# Patient Record
Sex: FEMALE | Race: BLACK OR AFRICAN AMERICAN | NOT HISPANIC OR LATINO | Employment: FULL TIME | URBAN - METROPOLITAN AREA
[De-identification: names, ages, dates, MRNs, and addresses within clinical notes are randomized per-mention and may not be internally consistent; named-entity substitution may affect disease eponyms.]

---
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
## 2023-01-11 ENCOUNTER — TELEPHONE (OUTPATIENT)
Dept: OBSTETRICS AND GYNECOLOGY | Facility: CLINIC | Age: 18
End: 2023-01-11

## 2023-08-16 ENCOUNTER — HOSPITAL ENCOUNTER (EMERGENCY)
Facility: HOSPITAL | Age: 18
Discharge: LEFT BEFORE DISCHARGE | End: 2023-08-16
Payer: COMMERCIAL

## 2023-08-16 VITALS
TEMPERATURE: 98.2 F | DIASTOLIC BLOOD PRESSURE: 110 MMHG | RESPIRATION RATE: 18 BRPM | HEART RATE: 105 BPM | WEIGHT: 210 LBS | SYSTOLIC BLOOD PRESSURE: 146 MMHG | BODY MASS INDEX: 33.75 KG/M2 | HEIGHT: 66 IN | OXYGEN SATURATION: 100 %

## 2023-08-16 PROCEDURE — 99211 OFF/OP EST MAY X REQ PHY/QHP: CPT

## 2023-08-16 RX ORDER — SODIUM CHLORIDE 0.9 % (FLUSH) 0.9 %
10 SYRINGE (ML) INJECTION AS NEEDED
Status: DISCONTINUED | OUTPATIENT
Start: 2023-08-16 | End: 2023-08-17 | Stop reason: HOSPADM

## 2023-08-17 NOTE — ED NOTES
Patient arrived via EMS stretcher.  EMS reports the call was for foreign body.  Patient states she got choked on food with a bone but coughed it up, thinks she scratched her throat.  EMS reports that patient is hypertensive and hyperglycemia.  Patient had eclampsia during pregnancy and states that she has not been seen since giving birth in March. Patient AA&Ox4 at time of triage.  EMS blood sugar 394

## 2024-02-22 ENCOUNTER — APPOINTMENT (OUTPATIENT)
Dept: GENERAL RADIOLOGY | Facility: HOSPITAL | Age: 19
End: 2024-02-22
Payer: COMMERCIAL

## 2024-02-22 ENCOUNTER — HOSPITAL ENCOUNTER (EMERGENCY)
Facility: HOSPITAL | Age: 19
Discharge: HOME | End: 2024-02-22
Attending: STUDENT IN AN ORGANIZED HEALTH CARE EDUCATION/TRAINING PROGRAM
Payer: COMMERCIAL

## 2024-02-22 VITALS
RESPIRATION RATE: 18 BRPM | HEIGHT: 66 IN | SYSTOLIC BLOOD PRESSURE: 124 MMHG | WEIGHT: 230 LBS | TEMPERATURE: 98.6 F | BODY MASS INDEX: 36.96 KG/M2 | OXYGEN SATURATION: 98 % | HEART RATE: 100 BPM | DIASTOLIC BLOOD PRESSURE: 68 MMHG

## 2024-02-22 DIAGNOSIS — R13.10 DYSPHAGIA, UNSPECIFIED TYPE: Primary | ICD-10-CM

## 2024-02-22 PROCEDURE — 99282 EMERGENCY DEPT VISIT SF MDM: CPT

## 2024-02-22 PROCEDURE — 70360 X-RAY EXAM OF NECK: CPT

## 2024-02-22 PROCEDURE — 99283 EMERGENCY DEPT VISIT LOW MDM: CPT | Performed by: STUDENT IN AN ORGANIZED HEALTH CARE EDUCATION/TRAINING PROGRAM

## 2024-02-23 NOTE — FSED PROVIDER NOTE
"Subjective   History of Present Illness  19-year-old female history of hypothyroidism presents emergency department for \"food stuck in throat \".  Patient reports she was eating grapes this morning and believes part of a great became stuck in her throat.  She has been able to drink fluids all day.  She also was able to eat a banana but reports that she tried to eat rice that was difficult for her.  Denies any prior esophageal food bolus or impaction.        Review of Systems   All other systems reviewed and are negative.      History reviewed. No pertinent past medical history.    No Known Allergies    History reviewed. No pertinent surgical history.    History reviewed. No pertinent family history.    Social History     Socioeconomic History    Marital status: Single           Objective   Physical Exam  Vitals reviewed.   Constitutional:       Appearance: Normal appearance.   HENT:      Head: Normocephalic and atraumatic.      Jaw: No trismus, swelling or malocclusion.      Right Ear: External ear normal.      Left Ear: External ear normal.      Nose: Nose normal.      Mouth/Throat:      Mouth: Mucous membranes are moist. No injury, lacerations or angioedema.      Tongue: No lesions.      Palate: No mass.      Pharynx: Oropharynx is clear. Uvula midline. No pharyngeal swelling, oropharyngeal exudate, posterior oropharyngeal erythema or uvula swelling.      Tonsils: No tonsillar exudate.   Eyes:      Extraocular Movements: Extraocular movements intact.      Conjunctiva/sclera: Conjunctivae normal.   Cardiovascular:      Rate and Rhythm: Normal rate and regular rhythm.   Pulmonary:      Effort: Pulmonary effort is normal.      Breath sounds: Normal breath sounds.   Musculoskeletal:         General: Normal range of motion.      Cervical back: Normal range of motion and neck supple. No rigidity or tenderness.   Lymphadenopathy:      Cervical: No cervical adenopathy.   Skin:     General: Skin is warm and dry. "   Neurological:      General: No focal deficit present.      Mental Status: She is alert and oriented to person, place, and time.         Procedures           ED Course  ED Course as of 02/22/24 2229   Thu Feb 22, 2024 2150 Chest x-ray shows no acute process by my interpretation.  No foreign body seen on official x-ray read. [AN]      ED Course User Index  [AN] SherriPal juarez DO                                           Medical Decision Making  Patient complained of difficulty swallowing differential diagnosis is broad and includes but is not limited to esophageal impaction, esophageal foreign body, bacterial tracheitis, uvular edema, angioedema, epiglottitis,, esophageal irritation, or GERD/reflux.  Patient is nontoxic in appearance, no drooling, no hoarse voice and and without fever.  In this setting, I think bacterial tracheitis and epiglottitis are less likely.  She has no history or symptoms consistent with an allergic reaction.  Physical exam not consistent with angioedema.  Given the history prior to presenting complaint, I suspect esophageal irritation versus foreign body versus impaction more likely.  The patient was able to tolerate a p.o. challenge in the ED making impaction improbable.  Plain films of the neck were unremarkable.  Epiglottis was normal.  Patient likely has irritation to her esophagus after she ate this morning.  As she is able to tolerate oral intake.  Will have her follow-up outpatient with her primary care provider.  Return precautions were discussed.  Indications to follow-up with GI were discussed as well.  Patient clinically stable and requesting discharge at this time.  All questions answered to her and her family satisfaction prior to discharge.    Amount and/or Complexity of Data Reviewed  Radiology: ordered.        Final diagnoses:   Dysphagia, unspecified type       ED Disposition  ED Disposition       ED Disposition   Discharge    Condition   Stable    Comment   --                Provider, No Known  [place] [id number]  [phone]    Schedule an appointment as soon as possible for a visit in 3 days  As needed, If symptoms worsen         Medication List      No changes were made to your prescriptions during this visit.